# Patient Record
Sex: FEMALE | Race: WHITE
[De-identification: names, ages, dates, MRNs, and addresses within clinical notes are randomized per-mention and may not be internally consistent; named-entity substitution may affect disease eponyms.]

---

## 2017-01-12 ENCOUNTER — HOSPITAL ENCOUNTER (EMERGENCY)
Dept: HOSPITAL 84 - D.ER | Age: 22
LOS: 1 days | Discharge: HOME | End: 2017-01-13
Payer: MEDICAID

## 2017-01-12 VITALS — BODY MASS INDEX: 29.5 KG/M2

## 2017-01-12 DIAGNOSIS — J06.9: Primary | ICD-10-CM

## 2017-01-12 DIAGNOSIS — F17.200: ICD-10-CM

## 2017-05-27 ENCOUNTER — HOSPITAL ENCOUNTER (OUTPATIENT)
Dept: HOSPITAL 84 - D.LDO | Age: 22
Discharge: HOME | End: 2017-05-27
Attending: OBSTETRICS & GYNECOLOGY
Payer: MEDICAID

## 2017-05-27 VITALS — BODY MASS INDEX: 29.5 KG/M2

## 2017-05-27 DIAGNOSIS — O09.893: Primary | ICD-10-CM

## 2017-05-27 DIAGNOSIS — Z3A.31: ICD-10-CM

## 2017-05-27 DIAGNOSIS — B19.20: ICD-10-CM

## 2017-05-27 LAB
AMORPHOUS SEDIMENT: (no result) /LPF
AMPHETAMINES UR QL SCN: NEGATIVE QUAL
APPEARANCE UR: (no result)
BACTERIA #/AREA URNS HPF: (no result) /HPF
BARBITURATES UR QL SCN: NEGATIVE QUAL
BENZODIAZ UR QL SCN: NEGATIVE QUAL
BILIRUB SERPL-MCNC: NEGATIVE MG/DL
BZE UR QL SCN: NEGATIVE QUAL
CANNABINOIDS UR QL SCN: POSITIVE QUAL
COLOR UR: (no result)
ERYTHROCYTE [DISTWIDTH] IN BLOOD BY AUTOMATED COUNT: 12.6 % (ref 11.5–14.5)
GLUCOSE SERPL-MCNC: NEGATIVE MG/DL
HCT VFR BLD CALC: 35.9 % (ref 36–48)
HGB BLD-MCNC: 12.9 G/DL (ref 12–16)
KETONES UR STRIP-MCNC: (no result) MG/DL
LEUKOCYTE ESTERASE: (no result)
MCH RBC QN AUTO: 34.8 PG (ref 26–34)
MCHC RBC AUTO-ENTMCNC: 35.9 G/DL (ref 31–37)
MCV RBC: 96.8 FL (ref 80–100)
NITRITE UR-MCNC: NEGATIVE MG/ML
OPIATES UR QL SCN: NEGATIVE QUAL
PCP UR QL SCN: NEGATIVE QUAL
PH UR STRIP: 7 [PH] (ref 5–6)
PLATELET # BLD: 205 10X3/UL (ref 130–400)
PMV BLD AUTO: 9.9 FL (ref 7.4–10.4)
PROT UR-MCNC: NEGATIVE MG/DL
RBC # BLD AUTO: 3.71 10X6/UL (ref 4–5.4)
RBC #/AREA URNS HPF: (no result) /HPF (ref 0–5)
SP GR UR STRIP: 1.01 (ref 1–1.02)
SQUAMOUS #/AREA URNS HPF: (no result) /HPF (ref 0–5)
UDS - METH: NEGATIVE QUAL
UROBILINOGEN UR-MCNC: NORMAL MG/DL
WBC # BLD AUTO: 13.3 10X3/UL (ref 4.8–10.8)
WBC #/AREA URNS HPF: (no result) /HPF (ref 0–5)

## 2017-06-23 ENCOUNTER — HOSPITAL ENCOUNTER (EMERGENCY)
Dept: HOSPITAL 84 - D.ER | Age: 22
Discharge: HOME | End: 2017-06-23
Payer: MEDICAID

## 2017-06-23 VITALS — BODY MASS INDEX: 29.5 KG/M2

## 2017-06-23 DIAGNOSIS — Z3A.30: ICD-10-CM

## 2017-06-23 DIAGNOSIS — B19.20: ICD-10-CM

## 2017-06-23 DIAGNOSIS — F17.200: ICD-10-CM

## 2017-06-23 DIAGNOSIS — H66.90: ICD-10-CM

## 2017-06-23 DIAGNOSIS — O26.893: Primary | ICD-10-CM

## 2017-06-23 DIAGNOSIS — J06.9: ICD-10-CM

## 2017-06-30 ENCOUNTER — HOSPITAL ENCOUNTER (OUTPATIENT)
Dept: HOSPITAL 84 - D.LDO | Age: 22
Discharge: HOME | End: 2017-06-30
Attending: OBSTETRICS & GYNECOLOGY
Payer: MEDICAID

## 2017-06-30 VITALS — BODY MASS INDEX: 29.5 KG/M2

## 2017-06-30 DIAGNOSIS — O16.3: Primary | ICD-10-CM

## 2017-06-30 DIAGNOSIS — Z3A.31: ICD-10-CM

## 2017-08-15 ENCOUNTER — HOSPITAL ENCOUNTER (INPATIENT)
Dept: HOSPITAL 84 - D.LDO | Age: 22
LOS: 2 days | Discharge: HOME | End: 2017-08-17
Attending: OBSTETRICS & GYNECOLOGY | Admitting: OBSTETRICS & GYNECOLOGY
Payer: MEDICAID

## 2017-08-15 VITALS — DIASTOLIC BLOOD PRESSURE: 73 MMHG | SYSTOLIC BLOOD PRESSURE: 123 MMHG

## 2017-08-15 VITALS — BODY MASS INDEX: 24.91 KG/M2 | WEIGHT: 155 LBS | HEIGHT: 66 IN

## 2017-08-15 DIAGNOSIS — O26.893: ICD-10-CM

## 2017-08-15 DIAGNOSIS — B19.20: ICD-10-CM

## 2017-08-15 DIAGNOSIS — F41.9: ICD-10-CM

## 2017-08-15 DIAGNOSIS — Z3A.38: ICD-10-CM

## 2017-08-15 DIAGNOSIS — Z67.91: ICD-10-CM

## 2017-08-15 LAB
AMPHETAMINES UR QL SCN: NEGATIVE QUAL
APPEARANCE UR: CLEAR
BARBITURATES UR QL SCN: NEGATIVE QUAL
BENZODIAZ UR QL SCN: NEGATIVE QUAL
BILIRUB SERPL-MCNC: NEGATIVE MG/DL
BZE UR QL SCN: NEGATIVE QUAL
CANNABINOIDS UR QL SCN: NEGATIVE QUAL
COLOR UR: YELLOW
ERYTHROCYTE [DISTWIDTH] IN BLOOD BY AUTOMATED COUNT: 12.7 % (ref 11.5–14.5)
GLUCOSE SERPL-MCNC: NEGATIVE MG/DL
HCT VFR BLD CALC: 35.3 % (ref 36–48)
HGB BLD-MCNC: 12.7 G/DL (ref 12–16)
HIV1+2 AB SPEC QL IA.RAPID: NEGATIVE
KETONES UR STRIP-MCNC: NEGATIVE MG/DL
LEUKOCYTE ESTERASE: NEGATIVE
MCH RBC QN AUTO: 35 PG (ref 26–34)
MCHC RBC AUTO-ENTMCNC: 36 G/DL (ref 31–37)
MCV RBC: 97.2 FL (ref 80–100)
NITRITE UR-MCNC: NEGATIVE MG/ML
OPIATES UR QL SCN: NEGATIVE QUAL
PCP UR QL SCN: NEGATIVE QUAL
PH UR STRIP: 7 [PH] (ref 5–6)
PLATELET # BLD: 181 10X3/UL (ref 130–400)
PMV BLD AUTO: 10.6 FL (ref 7.4–10.4)
PROT UR-MCNC: NEGATIVE MG/DL
RBC # BLD AUTO: 3.63 10X6/UL (ref 4–5.4)
SP GR UR STRIP: 1.01 (ref 1–1.02)
UDS - METH: NEGATIVE QUAL
UROBILINOGEN UR-MCNC: NORMAL MG/DL
WBC # BLD AUTO: 14.6 10X3/UL (ref 4.8–10.8)

## 2017-08-15 NOTE — NUR
ICE WATER, COLA, AND COFFEE PROVIDED PER PT REQUEST. PT REQUEST PRN PAIN MED
FOR CRAMPING AND LOWER BACK PAIN "5/10". SEE EMAR FOR MED ADMIN. NO OTHER
NEEDS VOICED. INSTRUCTED TO CALL FOR ANY NEEDS. PT VERB UNDERSTANDING.

## 2017-08-15 NOTE — NUR
PT REQUESTS IV BE TAKEN OUT. LOCHIA SCANT WITH NO CLOTS. PT VOIDING WITHOUT
DIFFICULTY. AMBULATING WITHOUT DIFFICULTY. IV TO L WRIST DC'D PER REQUEST WITH
TIP INTACT. BANDAGE AND PRESSURE TO SITE. PT TOLERATED WELL.

## 2017-08-15 NOTE — NUR
PT ASSISTED UP TO BATHROOM TO VOID. VOIDED LARGE AMOUNT WITHOUT DIFFICULTY. PT
PERFORME OWN SHERRIE CARE. CLEAN MESH PANTIES/PAD PLACED. PT ASSISTED BACK INTO
BED. DENIES ANY OTHER NEEDS OR CONCERNS.

## 2017-08-15 NOTE — NUR
ASSISTED PT UP TO BATHROOM TO VOID. PT AMBULATED WITH STEADY GAIT WITH NO
ASSISTANCE OF NURSE. REPORTS SENSATION HAS RETURNED TO LLE BUT NOW C/O SEVERE
PAIN IN SCIATIC AREA OF L LEG AND LOWER BACK. PT ALSO C/O HEADACHE AGAIN. PT
VOIDED WITHOUT DIFFICULTY AND AMBULATED BACK INTO BED WITHOUT ASSISTANCE.
DR. ZAMARRIPA NOTIFIED OF PTS C/O. NEW ORDER RVD FOR PRN PAIN MED. SEE ORDERS.

## 2017-08-16 VITALS — SYSTOLIC BLOOD PRESSURE: 118 MMHG | DIASTOLIC BLOOD PRESSURE: 71 MMHG

## 2017-08-16 VITALS — SYSTOLIC BLOOD PRESSURE: 109 MMHG | DIASTOLIC BLOOD PRESSURE: 66 MMHG

## 2017-08-16 VITALS — DIASTOLIC BLOOD PRESSURE: 78 MMHG | SYSTOLIC BLOOD PRESSURE: 143 MMHG

## 2017-08-16 LAB
BASOPHILS NFR BLD AUTO: 0.2 % (ref 0–2)
EOSINOPHIL NFR BLD: 0.6 % (ref 0–7)
ERYTHROCYTE [DISTWIDTH] IN BLOOD BY AUTOMATED COUNT: 12.7 % (ref 11.5–14.5)
HCT VFR BLD CALC: 32.8 % (ref 36–48)
HCV AB S/CO SERPL IA: >11 (ref 0–0.9)
HGB BLD-MCNC: 11.6 G/DL (ref 12–16)
IMM GRANULOCYTES NFR BLD: 0.4 % (ref 0–5)
LYMPHOCYTES NFR BLD AUTO: 32.9 % (ref 15–50)
MCH RBC QN AUTO: 34.5 PG (ref 26–34)
MCHC RBC AUTO-ENTMCNC: 35.4 G/DL (ref 31–37)
MCV RBC: 97.6 FL (ref 80–100)
MONOCYTES NFR BLD: 6 % (ref 2–11)
NEUTROPHILS NFR BLD AUTO: 59.9 % (ref 40–80)
PLATELET # BLD: 180 10X3/UL (ref 130–400)
PMV BLD AUTO: 10.5 FL (ref 7.4–10.4)
RBC # BLD AUTO: 3.36 10X6/UL (ref 4–5.4)
RUBV IGG SERPL IA-ACNC: 9.57 INDEX
WBC # BLD AUTO: 17 10X3/UL (ref 4.8–10.8)

## 2017-08-16 NOTE — NUR
AMBULATING IN HALLWAY. TO NBN AND PUSHING INFANT IN OPEN CRIB BACK TO ROOM.
PT. CHEERFUL AND WITH STEADY GAIT.

## 2017-08-16 NOTE — NUR
RECEIVED PT LYING SUPINE IN BED. AWAKE. VSS. HRRR WITHOUT AUDIBLE MURMUR.
INSPIRATORY WHEEZES NOTED TO UPPER LOBES. CLEAR TO LOWER LOBES. BS X 4.
ABDOMEN SOFT/NON-DISTENDED. FUNDUS FIRM AT U/U. RUBRA LOCHIA SMALL AMT. NO
CLOTS OR HEAVY BLEEDING NOTED. NEG HOMANS' SIGN. PPP. NO EDEMA NOTED TO BLE.
PT DENIES NEEDS. SR UPX  2. CALL LIGHT IN REACH.

## 2017-08-16 NOTE — NUR
PT. WALKING ABOUT IN ROOM. INTO BED FOR ASSESSEMENT. ONE SMALL CHILD WALKING
ABOUT IN ROOM AND ANOTHER IN INFANT SEAT. FOB ON SOFA. BREATH SOUNDS CLEAR AND
BOWEL SOUNDS AUDIBLE.  PT. REPORTS PAIN SCALE OF 6 OF 10 ON PAIN SCALE.
REPORTS HEADACHE WHICH SHE STATES IS SINUS RELATED, BACKPAIN AND LT. LOWER
ABD. CRAMPING. RATES AS A 6 OF 10 ON PAIN SCALE. FUNDUS FIRM U/2 AND LOCHIA
RUBRA MOD. SKIN WARM AND DRY.

## 2017-08-16 NOTE — NUR
Pt ambulatory to nurses desk. Reports is feeling better this morning. Denies
any needs. Ambulated to room with steady gait.

## 2017-08-16 NOTE — NUR
Pt ambulatory to nurses desk requesting refill on ice water. Ice water
provided. Pt then went to NBN to check on infant.

## 2017-08-16 NOTE — NUR
PT RINGS CALL LIGHT REQUESTING PAIN MEDICATION NOW. RATES PAIN 7.5/10. NORCO
5/325MG ONE TAB GIVEN. PT DENIES FURTHER NEEDS AT THIS TIME.

## 2017-08-16 NOTE — NUR
PT SITTING UP IN BED. C/O PAIN TO BACK AND LEFT HIP AND LEG OF "8" ON 0-10
PAIN SCALE. NORCO 5/325 GIVEN PO AS ORDERED. PT INSTRUCTED ON MED. VERBALIZES
UNDERSTANDING.

## 2017-08-16 NOTE — NUR
Pt reports voiding without difficulty. Vag bleeding scant with no clots.
Fundus firm 1FB below umbillicus and midline.

## 2017-08-16 NOTE — NUR
PT SITTING UP IN BED. TALKING ON PHONE. VS NOTED. DENIES HEAVY BLEEDING OR
PASSING CLOTS. DENIES LIGHTHEADEDNESS OR FEELING FAINT. PT C/O BACK AND
ABDOMINAL PAIN OF "7-8" ON 0-10 PAIN SCALE. NORCO 5/325 GIVEN PO AS ORDERED.
PT INSTRUCTED ON MED. VERBALIZES UNDERSTANDING.

## 2017-08-16 NOTE — NUR
PT AMBULATORY IN [place] WITH INFANT. RETURNING INFANT TO [facility] AT THIS TIME.
DENIES C/O OR NEEDS.

## 2017-08-16 NOTE — NUR
Pt request prn pain med for lower back pain and pain in sciatic area. Pt rates
pain "8/10". Also given heating pad to help relieve pain. No other needs
voiced. C/L in reach. Bed low. SR upx2.

## 2017-08-16 NOTE — NUR
MOTRIN AND NORCO GIVEN AS ORDERED AND PER PT. REQUEST. PT. AND FOB WITH
QUESTIONS ABOUT GBS AND TREATMENT AND ALSO RHOGAM SHOT AND REASON FOR. BOTH
ISSUES EXPLAINED AND PT. AND FOB QUESTIONS ANSWERED.

## 2017-08-16 NOTE — NUR
PT CALLS ON LIGHT. C/O PAIN TO LEFT LOWER QUADRANT OF ABDOMEN. FUNDUS FIRM AT
U/1. RUBRA LOCHIA SMALL AMT. PT STATES "I HAVEN'T HAD A BOWEL MOVEMENT IN
THREE DAYS".  STATES PASSING GAS. ABDOMEN SOFT/NON-DISTENDED. PT PROVIDED
WARMED APPLE AND PRUNE JUICE.

## 2017-08-16 NOTE — NUR
Pt approaches desk reporting pain in bilateral upper quadrants of abdomen.
Followed pt to room to obtain VS and further assess pts pain. Abd soft and non
tender to palpation. BS active x4 quads. Pt reports pain starts when she lays
on her left side. Offered prn pain med and pt accepted. See EMAR. Will follow
up to assess effectiveness. Pt denies any other needs. C/L in reach. Bed low.
SR upx2.

## 2017-08-16 NOTE — OP
PATIENT NAME:  YAN TINOCO                       MEDICAL RECORD: [id number]
:89                                             LOCATION:[id number]
                                                         ADMISSION DATE:08/15/17
SURGEON:  AUGUSTIN ROSARIO MD             
 
 
DATE OF OPERATION:  08/15/2017
 
Delivery Note
 
Precipitous controlled delivery of 6 pounds 2 ounces male infant, 9 and 9
Apgars, epidural anesthesia, no laceration.  Spontaneous delivery of
intact-appearing placenta.
 
ESTIMATED BLOOD LOSS:  400 cc.
 
COMPLICATIONS:  None.
 
TRANSINT:[id number] Voice Confirmation ID: [id number] DOCUMENT ID: [id number]
                                           
                                           AUGUSTIN ROSARIO MD             
 
 
 
Electronically Signed by AUGUSTIN ROSARIO on 17 at 2327
 
 
 
 
 
 
 
 
 
 
 
 
 
 
 
 
 
 
 
 
 
 
 
 
 
CC:                                                             [id number]
DICTATION DATE: 17 0757     :     17 0819      ADM IN  
                                                                              
[id number] [place] [address]

## 2017-08-16 NOTE — NUR
ROUNDS MADE. PT CURRENTLY LYING IN BED AA&O X4. PAIN AND NEEDS ASSESSED. PT IS
BEGINNING TO EAT A SALAD. REQUEST SALAD DRESSING. ONE PACKAGE PROVIDED. PT
REPORT HER PAIN IS "FINE" RIGHT NOW AND DECLINES OFFER MADE TO RECEIVE
MEDICATION AT THIS TIME. PT REPORTS SHE WANTS TO EAT SOMETHING BEFORE SHE
TAKES ANY PAIN MEDICATION TO VOID BECOMING NAUSEATED. NO FURTHER NEEDS VOICED
AT THIS TIME. INFANT QUIET IN OPEN CRIB AT PT'S BEDSIDE.

## 2017-08-17 VITALS — DIASTOLIC BLOOD PRESSURE: 53 MMHG | SYSTOLIC BLOOD PRESSURE: 113 MMHG

## 2017-08-17 NOTE — NUR
ROUNDS MADE. PT LYING IN BED AWAKE WATCHING TV. INFANT SWADDLED IN CRIB AT
BEDSIDE. PT'S PAIN REASSESSED. PT REPORTS "A MANAGABLE 6." STATES MY GUTS ARE
STARTING TO CRAMP. OFFER MADE TO BRING PT MOTRIN AT NEXT TIME ALLOWED. PT
REQUEST THAT MOTRIN BE BROUGHT TO HER WHEN SHE CAN HAVE IT. DENIES FURTHER
NEEDS AT THIS TIME.

## 2017-08-17 NOTE — NUR
TO ROOM, AM ASSESSMENT COMPLETED.  SEE FLOWSHEET.  PT DENIES HEAVY BLEEDING OR
PASSING CLOTS.  PT REQUESTS PAIN MEDICATION FOR ABD/BACK PAIN/CRAMPING.  SEE
EMAR FOR ALL MEDS ADM BY THIS RN.  PT HAS BREAKFAST TRAY ON BEDSIDE TABLE.  SR
UP X2, CL/PHONE WITHIN REACH.  PT DENIES OTHER NEEDS AT THIS TIME.

## 2017-08-17 NOTE — NUR
DISCHARGE INSTRUCTIONS EXPLAINED TO PT, COPY GIVEN, ALONG WITH PRESCRIPTION
FOR MOTRIN, FOLLOW UP APPT CARD, PP INSTRUCTION SHEET, AND HOME MED REC.  PT
ASKING QUESTIONS REGARDING TAKING THE DEPO PROVERA AND HAVING HEPATITS C.  DR. ROSARIO NOTIFIED OF PT'S CONCERN.  TELEPHONE ORDER RECEIVED TO HOLD DEPO
PROVERA UNTIL PT CLEARS THIS WITH HER LIVER DOCTOR.  PT INFORMED, STATING
"LET'S JUST PRETEND I DIDN'T ASK THEN".  DEPO PROVERA NOT GIVEN TODAY.
IMPORTANCE STRESSED NOTHING IN VAGINA UNTIL CLEARED BY DOCTOR AT HER
POSTPARTUM FOLLOW UP APPT.  PT AGREES.  AWAITING FOR HER RIDE TO ARRIVE TO
TAKE HER HOME.

## 2017-08-17 NOTE — NUR
PT'S RIDE HERE, PT REFUSES WHEELCHAIR, PT AMBULATORY OFF UNIT WITH INFANT IN
CARSEAT TO PRIVATE VEHICLE WITH FRIEND DRIVING.

## 2017-08-17 NOTE — NUR
PT MEDICATED W/MOTRIN 600MG PO. DENIES NEEDS AT THIS TIME. INFANT SWADDLED IN
CRIB AT PT'S BEDSIDE. QUIET AND W/OUT RESP DISTRESS.

## 2017-08-17 NOTE — NUR
ROUNDS MADE. PT LYING TO LEFT SIDE W/EYES CLOSED, OPENS EYES SPONTANEOUSLY
WHILE THIS RN AT THE DOOR. PT DENIES PAIN OR NEEDS AT PRESENT.